# Patient Record
Sex: MALE | Employment: FULL TIME | URBAN - METROPOLITAN AREA
[De-identification: names, ages, dates, MRNs, and addresses within clinical notes are randomized per-mention and may not be internally consistent; named-entity substitution may affect disease eponyms.]

---

## 2024-09-19 ENCOUNTER — OFFICE VISIT (OUTPATIENT)
Age: 59
End: 2024-09-19
Payer: COMMERCIAL

## 2024-09-19 ENCOUNTER — EVALUATION (OUTPATIENT)
Age: 59
End: 2024-09-19
Payer: COMMERCIAL

## 2024-09-19 VITALS — HEIGHT: 69 IN | BODY MASS INDEX: 24.44 KG/M2 | WEIGHT: 165 LBS

## 2024-09-19 DIAGNOSIS — M25.60 DECREASED RANGE OF MOTION: ICD-10-CM

## 2024-09-19 DIAGNOSIS — M20.011 MALLET FINGER OF RIGHT HAND: Primary | ICD-10-CM

## 2024-09-19 DIAGNOSIS — M20.011 MALLET FINGER OF RIGHT HAND: ICD-10-CM

## 2024-09-19 DIAGNOSIS — M20.011 MALLET DEFORMITY OF RIGHT RING FINGER: Primary | ICD-10-CM

## 2024-09-19 PROCEDURE — 97165 OT EVAL LOW COMPLEX 30 MIN: CPT

## 2024-09-19 PROCEDURE — 99204 OFFICE O/P NEW MOD 45 MIN: CPT | Performed by: ORTHOPAEDIC SURGERY

## 2024-09-19 PROCEDURE — L3933 FO W/O JOINTS CF: HCPCS

## 2024-09-20 ENCOUNTER — TELEPHONE (OUTPATIENT)
Dept: ORTHOPEDIC SURGERY | Age: 59
End: 2024-09-20

## 2024-09-23 ENCOUNTER — TREATMENT (OUTPATIENT)
Age: 59
End: 2024-09-23
Payer: COMMERCIAL

## 2024-09-23 DIAGNOSIS — M25.60 DECREASED RANGE OF MOTION: Primary | ICD-10-CM

## 2024-09-23 DIAGNOSIS — Z78.9 DECREASED ACTIVITIES OF DAILY LIVING (ADL): ICD-10-CM

## 2024-09-23 PROCEDURE — 97763 ORTHC/PROSTC MGMT SBSQ ENC: CPT

## 2024-09-30 ENCOUNTER — TREATMENT (OUTPATIENT)
Age: 59
End: 2024-09-30
Payer: COMMERCIAL

## 2024-09-30 DIAGNOSIS — Z78.9 DECREASED ACTIVITIES OF DAILY LIVING (ADL): ICD-10-CM

## 2024-09-30 DIAGNOSIS — M25.60 DECREASED RANGE OF MOTION: Primary | ICD-10-CM

## 2024-09-30 PROCEDURE — 97763 ORTHC/PROSTC MGMT SBSQ ENC: CPT

## 2024-09-30 NOTE — PROGRESS NOTES
[facility] ORTHOPAEDICS  [address] [place] [id number]  Dept: [phone]      Occupational Therapy Daily Note     Referring MD: Xiomy Vogt MD    Diagnosis:    Diagnosis Orders   1. Decreased range of motion        2. Decreased activities of daily living (ADL)             Surgery: Date n/a     Therapy precautions:  mallet finger    Payor: Payor: [facility] /  /  /  Billing pattern: Commercial- substantial/midpoint time each CPT  Total Direct Treatment Time: 15 min                       Total In Office Time: 15 min   Modifier needed: No  Episode visit count:  3     PERTINENT MEDICAL HISTORY     PMHX & Meds: No past medical history on file., No past surgical history on file.   Medications. : Reviewed in chart  Allergies: Not on File     SUBJECTIVE     Current Symptoms/Chief complaints:   Chief Complaint   Patient presents with    Finger Injury       OBJECTIVE     Functional Outcome Measures: Quick Dash  TBA score=   TBA % functional deficit  Hand/Side Dominance: right handed      Digital ROM  (assessed while protected in splint) involved digit   AROM    MCP    AROM      PIP    AROM      DIP N/A     Orthotic Management and Training Subsequent Encounter (57525) x 15 minutes: subsequent encounter for modifications, fitting adjustments, and additional training  Reviewed precautions; patient independent  Patient tolerating splint with no signs of skin irritation  Patient able to remove splint and maintain precautions  K-tape changed; no signs of skin irritation    ASSESSMENT       The patient presents with right RF Mallet deformity sustained while playing volleyball. The patient was seen for a orthotic check. Patient tolerating well and maintaining mallet finger precautions.    PLAN OF CARE     Progress per protocol    GOALS     Short term goals:  (4 weeks, 10/17/2024  (4 weeks))  Patient will be I with HEP and precautions.  Patient to demonstrate independence with donning/doffing orthosis in one visit,

## 2024-10-07 ENCOUNTER — TREATMENT (OUTPATIENT)
Age: 59
End: 2024-10-07
Payer: COMMERCIAL

## 2024-10-07 DIAGNOSIS — Z78.9 DECREASED ACTIVITIES OF DAILY LIVING (ADL): ICD-10-CM

## 2024-10-07 DIAGNOSIS — M25.60 DECREASED RANGE OF MOTION: Primary | ICD-10-CM

## 2024-10-07 PROCEDURE — 97763 ORTHC/PROSTC MGMT SBSQ ENC: CPT

## 2024-10-07 NOTE — PROGRESS NOTES
[facility] ORTHOPAEDICS  [address] [place] [id number]  Dept: [phone]      Occupational Therapy Daily Note     Referring MD: Xiomy Vogt MD    Diagnosis:    Diagnosis Orders   1. Decreased range of motion        2. Decreased activities of daily living (ADL)               Surgery: Date n/a     Therapy precautions:  mallet finger    Payor: Payor: [facility] /  /  /  Billing pattern: Commercial- substantial/midpoint time each CPT  Total Direct Treatment Time: 15 min                       Total In Office Time: 15 min   Modifier needed: No  Episode visit count:  4     PERTINENT MEDICAL HISTORY     PMHX & Meds: No past medical history on file., No past surgical history on file.   Medications. : Reviewed in chart  Allergies: Not on File     SUBJECTIVE     Current Symptoms/Chief complaints:   Chief Complaint   Patient presents with    Finger Injury       OBJECTIVE     Functional Outcome Measures: Quick Dash  TBA score=   TBA % functional deficit  Hand/Side Dominance: right handed      Digital ROM  (assessed while protected in splint) involved digit   AROM    MCP    AROM      PIP    AROM      DIP N/A     Orthotic Management and Training Subsequent Encounter (13265) x 15 minutes: subsequent encounter for modifications, fitting adjustments, and additional training  Reviewed precautions; patient independent  Patient tolerating splint with no signs of skin irritation  Patient able to remove splint and maintain precautions  K-tape changed; no signs of skin irritation    ASSESSMENT       The patient presents with right RF Mallet deformity sustained while playing volleyball. The patient was seen for a orthotic check. Patient tolerating well and maintaining mallet finger precautions.    PLAN OF CARE     Progress per protocol. Will see in 10 days for recheck.     GOALS     Short term goals:  (4 weeks, 10/17/2024  (4 weeks))  Patient will be I with HEP and precautions.  Patient to demonstrate independence

## 2024-10-18 ENCOUNTER — TREATMENT (OUTPATIENT)
Age: 59
End: 2024-10-18

## 2024-10-18 DIAGNOSIS — M25.60 DECREASED RANGE OF MOTION: Primary | ICD-10-CM

## 2024-10-18 DIAGNOSIS — Z78.9 DECREASED ACTIVITIES OF DAILY LIVING (ADL): ICD-10-CM

## 2024-10-18 NOTE — PROGRESS NOTES
[facility] ORTHOPAEDICS  [address] [place] [phone]  Dept: [phone]      Occupational Therapy Daily Note     Referring MD: Xiomy Vogt MD    Diagnosis:    Diagnosis Orders   1. Decreased range of motion        2. Decreased activities of daily living (ADL)                 Surgery: Date n/a     Therapy precautions:  mallet finger    Payor: Payor: [facility] /  /  /  Billing pattern: Commercial- substantial/midpoint time each CPT  Total Direct Treatment Time: 15 min                       Total In Office Time: 15 min   Modifier needed: No  Episode visit count:  5     PERTINENT MEDICAL HISTORY     PMHX & Meds: No past medical history on file., No past surgical history on file.   Medications. : Reviewed in chart  Allergies: Not on File     SUBJECTIVE     Current Symptoms/Chief complaints:   Chief Complaint   Patient presents with    Finger Injury       OBJECTIVE     Functional Outcome Measures: Quick Dash  TBA score=   TBA % functional deficit  Hand/Side Dominance: right handed      Digital ROM  (assessed while protected in splint) involved digit   AROM    MCP    AROM      PIP    AROM      DIP N/A     Orthotic Management and Training Subsequent Encounter (05200) x 15 minutes: subsequent encounter for modifications, fitting adjustments, and additional training  Reviewed precautions; patient independent  Patient tolerating splint with no signs of skin irritation  Patient able to remove splint and maintain precautions  K-tape changed; no signs of skin irritation    ASSESSMENT       The patient presents with right RF Mallet deformity sustained while playing volleyball. The patient was seen for a orthotic check. Patient tolerating well and maintaining mallet finger precautions.    PLAN OF CARE     Progress per protocol.     GOALS     Short term goals:  (4 weeks, 10/17/2024  (4 weeks))  Patient will be I with HEP and precautions.  Patient to demonstrate independence with donning/doffing orthosis

## 2024-10-31 ENCOUNTER — TREATMENT (OUTPATIENT)
Age: 59
End: 2024-10-31

## 2024-10-31 DIAGNOSIS — Z78.9 DECREASED ACTIVITIES OF DAILY LIVING (ADL): ICD-10-CM

## 2024-10-31 DIAGNOSIS — M25.60 DECREASED RANGE OF MOTION: Primary | ICD-10-CM

## 2024-11-01 NOTE — PROGRESS NOTES
ACHIEVED 10/31/24  Patient will be I with HEP and precautions.  Patient to demonstrate independence with donning/doffing orthosis in one visit, Patient to verbalize understanding of inspecting skin to prevent pressure areas and allergic reaction due to orthosis, and Patient to verbalize understanding of precautions and necessity of wearing orthosis as indicated by therapist  The patient will have full MP flexion and extension of the affected digit in order to maximize functional use of hand with ADL's.  The patient will have full PIP extension and flexion of the affected digit in order to maximize functional use of hand with ADL's.  The patient will maintain precautions of full DIP extension in affected digit at all times for 6 weeks.      Long term goals: (12 weeks, 12/12/2024  (12 weeks) )  Pt will be able to use the affected UE for all ADL's without difficulty.  Increase active digit flexion of DIP joint in affected digit to 45° to improve ability to grasp.  Prevent DIP extension lag in affected digit no greater than 15°    Decrease edema in affected digit to minimal to improve motion.  Pt will have adequate strength to be able to hold and open containers without difficulty.  Pt will be able to make a full composite fist with the involved hand to enable to grasp and hold objects.  Improve Quick DASH functional assessment score from less than 20% deficit.

## 2024-11-11 ENCOUNTER — OFFICE VISIT (OUTPATIENT)
Age: 59
End: 2024-11-11
Payer: COMMERCIAL

## 2024-11-11 ENCOUNTER — TREATMENT (OUTPATIENT)
Age: 59
End: 2024-11-11
Payer: COMMERCIAL

## 2024-11-11 DIAGNOSIS — M25.60 DECREASED RANGE OF MOTION: Primary | ICD-10-CM

## 2024-11-11 DIAGNOSIS — M20.011 MALLET FINGER OF RIGHT HAND: Primary | ICD-10-CM

## 2024-11-11 DIAGNOSIS — Z78.9 DECREASED ACTIVITIES OF DAILY LIVING (ADL): ICD-10-CM

## 2024-11-11 PROCEDURE — 97110 THERAPEUTIC EXERCISES: CPT

## 2024-11-11 PROCEDURE — 99213 OFFICE O/P EST LOW 20 MIN: CPT | Performed by: ORTHOPAEDIC SURGERY

## 2024-11-11 NOTE — PROGRESS NOTES
Orthopaedic Hand Clinic Note    Name: Tacos Martinez  YOB: 1965  Gender: male  MRN: [id number]      Follow up visit:   1. Mallet finger of right hand        HPI: Tacos Martinez is a 59 y.o. male who is following up for right ring mallet finger. He has been complaint with use of his splint      ROS/Meds/PSH/PMH/FH/SH: I personally reviewed the patients standard intake form.  Pertinents are discussed in the HPI    Physical Examination:    Musculoskeletal Examination:  Examination on the right upper extremity demonstrates cap refill < 5 seconds in all fingers, skin is intact. There is no extension lag at the DIP. He is able to fire FDP.    Imaging / Electrodiagnostic Tests:     none    Assessment:     ICD-10-CM    1. Mallet finger of right hand  M20.011           Plan:   We discussed the diagnosis and different treatment options. We discussed observation, therapy, antiinflammatory medications and other pertinent treatment modalities.    After discussing in detail the patient elects to proceed with continuing hand therapy. He can begin weaning out of his mallet splint. He may develop a mild extensor lag as flexion improves, but this does not tend to result in a function deficit. He will follow up as needed.     Patient voiced accordance and understanding of the information provided and the formulated plan. All questions were answered to the patient's satisfaction during the encounter.      Xiomy Vogt MD  Orthopaedic Surgery  11/11/24  6:16 PM

## 2024-11-11 NOTE — PROGRESS NOTES
[facility] ORTHOPAEDICS  [address] [place] [id number]  Dept: [phone]      Occupational Therapy Progress Report     Referring MD: No ref. provider found    Diagnosis:    Diagnosis Orders   1. Decreased range of motion        2. Decreased activities of daily living (ADL)                   Surgery: Date n/a     Therapy precautions:  mallet finger    Payor: Payor: [facility] /  /  /  Billing pattern: Commercial- substantial/midpoint time each CPT  Total Direct Treatment Time: 15 min                       Total In Office Time: 15 min   Modifier needed: No  Episode visit count:  7     PERTINENT MEDICAL HISTORY     PMHX & Meds: No past medical history on file., No past surgical history on file.   Medications. : Reviewed in chart  Allergies: No Known Allergies     SUBJECTIVE     Current Symptoms/Chief complaints:   Chief Complaint   Patient presents with    Finger Injury       OBJECTIVE     Functional Outcome Measures: Quick Dash  TBA score=   TBA % functional deficit  Hand/Side Dominance: right handed      Digital ROM  (assessed while protected in splint) involved digit   AROM    MCP    AROM      PIP    AROM      DIP N/A     11/11/24 DIP 0/20       THERAPEUTIC EXERCISE (66778) x 15 min:     8 weeks post immobilization  The patient was instructed on the following:  Strong active extension of digit following by light active mid-range flexion  Continued PIP blocking exercises  Reviewed precautions  The patient was instructed to wear splint between exercises and at night  The patient was instructed to monitor for lag     ASSESSMENT       The patient presents with right RF Mallet deformity sustained while playing volleyball. The patient was cleared for DIP AROM and he is demonstrating 20° of flexion and no lag. ROM will be progressed incrementally in order to restore motion without /minimal lag.   New STGs established.  PLAN OF CARE     Progress per protocol.     GOALS     Short term goals:  (4 weeks,

## 2024-11-15 ENCOUNTER — TREATMENT (OUTPATIENT)
Age: 59
End: 2024-11-15

## 2024-11-15 DIAGNOSIS — M25.60 DECREASED RANGE OF MOTION: Primary | ICD-10-CM

## 2024-11-15 DIAGNOSIS — Z78.9 DECREASED ACTIVITIES OF DAILY LIVING (ADL): ICD-10-CM

## 2024-11-15 NOTE — PROGRESS NOTES
splint next session.     PLAN OF CARE     Progress per [place] protocol.     GOALS     Short term goals:  (4 weeks, 10/17/2024  (4 weeks)) ALL GOALS ACHIEVED 10/31/24  Patient will be I with HEP and precautions.  Patient to demonstrate independence with donning/doffing orthosis in one visit, Patient to verbalize understanding of inspecting skin to prevent pressure areas and allergic reaction due to orthosis, and Patient to verbalize understanding of precautions and necessity of wearing orthosis as indicated by therapist  The patient will have full MP flexion and extension of the affected digit in order to maximize functional use of hand with ADL's.  The patient will have full PIP extension and flexion of the affected digit in order to maximize functional use of hand with ADL's.  The patient will maintain precautions of full DIP extension in affected digit at all times for 6 weeks.    NEW STG 11/27/24  The patient will have increased DIP flexion to 50° in order to improve composite flexion.  Patient will continue to monitor for extension lag independently    Long term goals: (12 weeks, 12/12/2024  (12 weeks) )  Pt will be able to use the affected UE for all ADL's without difficulty.  Increase active digit flexion of DIP joint in affected digit to 45° to improve ability to grasp.  Prevent DIP extension lag in affected digit no greater than 15°    Decrease edema in affected digit to minimal to improve motion.  Pt will have adequate strength to be able to hold and open containers without difficulty.  Pt will be able to make a full composite fist with the involved hand to enable to grasp and hold objects.  Improve Quick DASH functional assessment score from less than 20% deficit.

## 2024-11-21 ENCOUNTER — TELEPHONE (OUTPATIENT)
Age: 59
End: 2024-11-21

## 2024-11-21 NOTE — TELEPHONE ENCOUNTER
I called and left a  for patient stating he does not need to attend his appointment today with Dr. Vogt.  I advised she's happy to see him if he would like to be seen or is having any new problems, but that I was trying to save him a trip.  Advised him to please call us back.

## 2024-12-05 ENCOUNTER — TREATMENT (OUTPATIENT)
Age: 59
End: 2024-12-05
Payer: COMMERCIAL

## 2024-12-05 DIAGNOSIS — M25.60 DECREASED RANGE OF MOTION: Primary | ICD-10-CM

## 2024-12-05 DIAGNOSIS — Z78.9 DECREASED ACTIVITIES OF DAILY LIVING (ADL): ICD-10-CM

## 2024-12-05 PROCEDURE — 97110 THERAPEUTIC EXERCISES: CPT

## 2024-12-05 NOTE — PROGRESS NOTES
[facility] ORTHOPAEDICS  [address] [place] [id number]  Dept: [phone]      Occupational Therapy Daily Note     Referring MD: Xiomy Vogt MD    Diagnosis:    Diagnosis Orders   1. Decreased range of motion        2. Decreased activities of daily living (ADL)                     Surgery: Date n/a     Therapy precautions:  mallet finger    Payor: Payor: [facility] /  /  /  Billing pattern: Commercial- substantial/midpoint time each CPT  Total Direct Treatment Time: 15 min                       Total In Office Time: 15 min   Modifier needed: No  Episode visit count:  9     PERTINENT MEDICAL HISTORY     PMHX & Meds: No past medical history on file., No past surgical history on file.   Medications. : Reviewed in chart  Allergies: No Known Allergies     SUBJECTIVE     Current Symptoms/Chief complaints:   Chief Complaint   Patient presents with    Finger Injury       OBJECTIVE     Digital ROM  (assessed while protected in splint) involved digit   AROM    MCP    AROM      PIP    AROM      DIP N/A     11/11/24 DIP 0/20  11/15/24 DIP 0/38  12/5/24  DIP-9/60       THERAPEUTIC EXERCISE (93114) x 15 min:     11 weeks post immobilization  The patient was instructed on the following:  Strong active extension of digit followed by light full composite flexion  Continued PIP blocking exercises  Reviewed precautions  The patient was instructed to wear splint at night; he will begin to wear splint every other night in 3 weeks, and discontinue use at night in 4 weeks.   Patient will wear splint during the day for 1-2 hours and out for 3 hours this week; in 2 weeks he will discontinue use during the day  The patient was instructed to monitor for lag     ASSESSMENT       The patient presents with right RF Mallet deformity sustained while playing volleyball. The patient is demonstrating increasing DIP AROM to 60°. He has a slight lag of -9°. He was instructed to wear splint on/off during the day and to continue